# Patient Record
Sex: FEMALE | Race: WHITE | Employment: FULL TIME | URBAN - METROPOLITAN AREA
[De-identification: names, ages, dates, MRNs, and addresses within clinical notes are randomized per-mention and may not be internally consistent; named-entity substitution may affect disease eponyms.]

---

## 2021-11-22 ENCOUNTER — TELEPHONE (OUTPATIENT)
Dept: PERINATAL CARE | Facility: HOSPITAL | Age: 26
End: 2021-11-22

## 2021-11-22 RX ORDER — PEN NEEDLE, DIABETIC 32 GX 1/4"
NEEDLE, DISPOSABLE MISCELLANEOUS
Qty: 1 EACH | Refills: 5 | Status: SHIPPED | OUTPATIENT
Start: 2021-11-22

## 2021-11-22 NOTE — TELEPHONE ENCOUNTER
25w0d  Received BS log for date range 11/16-11/21     Low  High Out of Range   Fasting Blood Sugar 82 96 1/7   Post Breakfast 96 127 1/7   Post Lunch 97 126 2/6   Post Dinner 93 157 4/6     Patient is currently taking     Levemir 30 units qPM

## 2021-11-30 ENCOUNTER — TELEPHONE (OUTPATIENT)
Dept: PERINATAL CARE | Facility: HOSPITAL | Age: 26
End: 2021-11-30

## 2021-11-30 NOTE — TELEPHONE ENCOUNTER
26w1d  Received BS log for date range 11/23-11/29     Low  High Out of Range   Fasting Blood Sugar 79 95 0/6   Post Breakfast 89 128 1/6   Post Lunch 88 142 (thanksgiving) 3/6   Post Dinner 116 154(did not take insulin) 3/6     Patient is currently taking

## 2021-12-02 PROBLEM — O24.119 PRE-EXISTING TYPE 2 INSULIN TREATED DIABETES MELLITUS DURING PREGNANCY (HCC): Status: ACTIVE | Noted: 2021-12-02

## 2021-12-02 PROBLEM — Z79.4 PRE-EXISTING TYPE 2 INSULIN TREATED DIABETES MELLITUS DURING PREGNANCY (HCC): Status: ACTIVE | Noted: 2021-12-02

## 2021-12-02 PROBLEM — O99.210 MATERNAL MORBID OBESITY, ANTEPARTUM (HCC): Status: ACTIVE | Noted: 2021-12-02

## 2021-12-02 PROBLEM — O24.912: Status: ACTIVE | Noted: 2021-12-02

## 2021-12-02 PROBLEM — O99.212 OBESITY AFFECTING PREGNANCY IN SECOND TRIMESTER: Status: ACTIVE | Noted: 2021-12-02

## 2021-12-02 PROBLEM — E66.01 MATERNAL MORBID OBESITY, ANTEPARTUM (HCC): Status: ACTIVE | Noted: 2021-12-02

## 2021-12-07 ENCOUNTER — TELEPHONE (OUTPATIENT)
Dept: PERINATAL CARE | Facility: HOSPITAL | Age: 26
End: 2021-12-07

## 2021-12-07 NOTE — TELEPHONE ENCOUNTER
Blood Glucose flow sheet  reviewed  through 12/7        Recommendations    Levemir 30 units qPM   novolog 10 units with dinner    Blood glucose monitoring   With weekly review by MFM    My chart message sent with MD recommendations

## 2021-12-14 ENCOUNTER — TELEPHONE (OUTPATIENT)
Dept: PERINATAL CARE | Facility: HOSPITAL | Age: 26
End: 2021-12-14

## 2021-12-14 NOTE — TELEPHONE ENCOUNTER
New regimen:    Levemir 30 units qPM   novolog 12 units with dinjun Zapata. Roderick Ramon M.D.   Maternal-Fetal Medicine

## 2021-12-14 NOTE — TELEPHONE ENCOUNTER
28w1d  Received BS log for date range 12/8-12/14     Low  High Out of Range   Fasting Blood Sugar 81 94 0/6   Post Breakfast 87 112 0/6   Post Lunch 73 118 0/6   Post Dinner 93 140(chicken sandwich) 3/6     Patient is currently taking     Levemir 30 units

## 2021-12-21 ENCOUNTER — TELEPHONE (OUTPATIENT)
Dept: PERINATAL CARE | Facility: HOSPITAL | Age: 26
End: 2021-12-21

## 2021-12-21 NOTE — TELEPHONE ENCOUNTER
29w1d  Received BS log for date range 12/15-12/20     Low  High Out of Range   Fasting Blood Sugar 81 93 0/6   Post Breakfast 110 126 1/6   Post Lunch 92 147 1/6   Post Dinner 108 154 3/6     Patient is currently taking     Levemir 30 units qPM   novolog 1

## 2021-12-21 NOTE — TELEPHONE ENCOUNTER
29w1d  Received BS log for date range 12/15-12/20       Low  High Out of Range   Fasting Blood Sugar 81 93 0/6   Post Breakfast 110 126 1/6   Post Lunch 92 147 1/6   Post Dinner 108 154 3/6      CHANGE:     Levemir 30 units qPM   novolog 14 units with dinn

## 2021-12-28 ENCOUNTER — TELEPHONE (OUTPATIENT)
Dept: PERINATAL CARE | Facility: HOSPITAL | Age: 26
End: 2021-12-28

## 2021-12-28 NOTE — TELEPHONE ENCOUNTER
Levemir 36 units qPM   novolog 14 units with dinner    When she runs out of levemir, switch to lantus with same dose as she used with levemir.

## 2021-12-28 NOTE — TELEPHONE ENCOUNTER
30w1d  Received BS log for date range 12/22-12/28     Low  High Out of Range   Fasting Blood Sugar 92 104 4/7   Post Breakfast 100 140 2/7   Post Lunch 102 138 1/6   Post Dinner 105 149 (3 values were high carb meals due to holiday birthday) 4/6     Marisol

## 2022-01-04 ENCOUNTER — TELEPHONE (OUTPATIENT)
Dept: PERINATAL CARE | Facility: HOSPITAL | Age: 27
End: 2022-01-04

## 2022-01-04 NOTE — TELEPHONE ENCOUNTER
31w1d  Received BS log for date range 12/29-1/3     Low  High Out of Range   Fasting Blood Sugar 82 98 2/6   Post Breakfast 104 122 1/6   Post lunch 96 143 2/6   Post Dinner 82 155 3/6     Levemir 36 units qPM   novolog 14 units with dinner     When she ru

## 2022-01-04 NOTE — TELEPHONE ENCOUNTER
New regimen:    Levemir 38 units qPM   novolog 16 units with dinner     When she runs out of levemir, switch to lantus with same dose as she used with levemir. Angelita Perez. Yesy Gomez M.D.   Maternal-Fetal Medicine

## 2022-01-09 PROBLEM — U07.1 COVID-19 AFFECTING PREGNANCY IN THIRD TRIMESTER: Status: ACTIVE | Noted: 2021-12-31

## 2022-01-09 PROBLEM — O98.513 COVID-19 AFFECTING PREGNANCY IN THIRD TRIMESTER: Status: ACTIVE | Noted: 2021-12-31

## 2022-01-11 ENCOUNTER — TELEPHONE (OUTPATIENT)
Dept: PERINATAL CARE | Facility: HOSPITAL | Age: 27
End: 2022-01-11

## 2022-01-11 NOTE — TELEPHONE ENCOUNTER
Blood Glucose flow sheet  reviewed  through 1/10        Recommendations    Levemir 38 units qPM   novolog 16 units with dinner     When she runs out of levemir, switch to lantus with same dose as she used with levemir.     Blood glucose monitoring   With we

## 2022-01-17 ENCOUNTER — TELEPHONE (OUTPATIENT)
Dept: PERINATAL CARE | Facility: HOSPITAL | Age: 27
End: 2022-01-17

## 2022-01-17 NOTE — TELEPHONE ENCOUNTER
Pt 33 0/7   Blood glucose log from  1/11/22- 1/16/22     Low  High Out of Range   Fasting Blood Sugar 78 88 0/6   Post Breakfast 92 138 1/5   Post Lunch 82 124 1/6   Post Dinner 95 152 1/6       Taking    Levemir 38 units qPM   novolog 16 units with dinner

## 2022-01-24 ENCOUNTER — TELEPHONE (OUTPATIENT)
Dept: PERINATAL CARE | Facility: HOSPITAL | Age: 27
End: 2022-01-24

## 2022-01-24 NOTE — TELEPHONE ENCOUNTER
Blood Glucose flow sheet  reviewed  through 1/23        Recommendations    Tiffany 38 units qPM   novolog 16 units with dinner    Blood glucose monitoring   With weekly review by MFM    My chart message sent with recommendations

## 2022-01-31 ENCOUNTER — TELEPHONE (OUTPATIENT)
Dept: PERINATAL CARE | Facility: HOSPITAL | Age: 27
End: 2022-01-31

## 2022-01-31 NOTE — TELEPHONE ENCOUNTER
Blood Glucose flow sheet  reviewed  through 1/30/22        Recommendations    Lantus 38 units qPM  Novolog 16 units with dinner    Blood glucose monitoring   With weekly review by MFM    My chart message sent to pt

## 2022-02-07 ENCOUNTER — TELEPHONE (OUTPATIENT)
Dept: PERINATAL CARE | Facility: HOSPITAL | Age: 27
End: 2022-02-07

## 2022-02-14 ENCOUNTER — TELEPHONE (OUTPATIENT)
Dept: PERINATAL CARE | Facility: HOSPITAL | Age: 27
End: 2022-02-14

## 2022-02-22 ENCOUNTER — TELEPHONE (OUTPATIENT)
Dept: PERINATAL CARE | Facility: HOSPITAL | Age: 27
End: 2022-02-22